# Patient Record
(demographics unavailable — no encounter records)

---

## 2025-02-03 NOTE — ASSESSMENT
[FreeTextEntry1] : ECG: SB, NSST, septal Q waves    HDL 47 LDL 89  (3/2024)   HDL 48 LDL 85  (8/2023)  HDL 44 LDL 74  (9/2022)  BUN 22 Creat 1.01 (8/2023)  PHARM NUCLEAR STRESS 5/2024: 1. Negative for ischemia/infarct, EF 61% 2. Resting /60 and peak 130/66  MCOT 2 week 12/2023: 1. Average HR 56, range  2. No evidence of high degree block 3. Slow rates during sleep, no symptoms 4. 2% burden PVCs and 2% APCs  AORTIC DUPLEX 1/2025: Fusiform AAA unchanged from prior, max diameter 3.9cm, moderate plaque  AORTIC DUPLEX 9/2023: 1. Moderate fusiform AAA with ulcerated plaque 2. Complex plaque throughout aorta 3. Max diameter 4 x 3.6cm  FERNANDO/PVR 9/2023: 1. R 0.61 L 0.63 2. PVR suggestive inflow disease  LE ARTERIAL DUPLEX 1/2025: Left mid-distal SFA occlusion with reconstitution distally   LE Arterial Duplex 9/2023: 1. Moderate to severe atherosclerosis 2. Left mid SFA occlusion with reconstitution distally via collaterals 3. Right distal SFA > 75% stenosis  ECHO 5/2024: 1. Normal LV size and function, EF 55-60% 2. Mild ESVIN, normal RV function 3. RVSP 36mmHg 4. Ascending Aorta 4.1cm, 1.9cm/m2  ECHO 4/2023: 1. Borderline LVE, EF 55% 2. Normal RV function 3. Moderate LAE 4. Mild to moderate MR with borderline prolapse 5. Mild dilation ascending aorta and root  CAROTID 4/2023: 1. 1-49% stenosis ICA's bilaterally 2. Antegrade flow in the vertebral arteries bilaterally 3. Early systolic deceleration in right vertebral artery  HOLTER 7/2019: 1. SR 2. 10 beats NSVT 3. Frequent multifocal PVCs, bigeminy/trigeminy (305/hr, 7320/day)  CAROTID 1/2022: 1. 1-49% stenosis ICA's bilaterally 2. Antegrade flow in the left vertebral artery 3. Systolic deceleration in the right vertebral suggestive of mild subclavian/innominate steal  ECHO 1/2022: 1. Mild LVH, EF 55-60% 2. Grade I diastolic dysfunction 3. Mild LAE 4. Normal RV/RA 5. Dilated AoR 3.8cm and Ascending Aorta 4.1cm  ECHO 12/2020: 1. Low normal LV systolic function, EF 50% 2. Grade I diastolic dysfunction 3. Mild RVE with normal function 4. Normal LA/RA 5. Mild MR 6. Dilated AoR (3.9cm) and Ascending Aorta (4cm)  ECHO 11/2019: 1. Low normal LV function, EF 50-55% 2. GRade I diastolic dysfunction 3. Normal RV 4. Borderline LAE 5. Mild MR/RD  ECHO 5/2019: 1. Normal left ventricular internal cavity size. 2. Moderately decreased global left ventricular systolic function. 3. Left ventricular ejection fraction, by visual estimation, is 35 to 40%. 4. The basal inferior segment, mid inferior segment, and basal inferoseptal segment are abnormal. 5. Mildly enlarged right ventricle. 6. Severely dilated left atrium. 7. Moderately dilated right atrium. 9. Mild mitral valve regurgitation. 10. There is mild dilatation of the ascending aorta.   NUCLEAR STRESS TEST 5/2019: 1. Negative for ischemia 2. EF 34% 3. NICM 4. Frequent PVCs/bigeminy/trigeminy  Aortic Scan 12/2020: 1. Small mid-distal AAA 2. No changes from prior 3. Measures 3.5 x 4.0cm  AORTIC SCAN 11/2019: 1. Small AAA, 3.3 x 4.1cm 2. Plaque in aorta  AORTIC SCAN 5/2019 1. Moderate distal AAA, 3.8 x 4.1cm 2. Large plaque in mid-distal aorta  CT chest 2017: mild emphysema CAROTID 2012: Mild plaque, no stenosis AORTIC SCAN 2012: Moderate atherosclerosis, mild ectasia.

## 2025-02-03 NOTE — HISTORY OF PRESENT ILLNESS
[FreeTextEntry1] : Patient is a 73yo M with h/o former tobacco dependence, colostomy after episode of diverticulitis 5/2023, family h/o aneurysm, AAA, PAD, NICM here for cardiac follow up.   Patient has been doing well without any chest pain. Patient denies PND/orthopnea/edema/palpitations/syncope. Has been trying to walk a bit more but remains fairly inactive. Does get fatigued/tired with activity/walking but stable. Notes pain in calves after about 1 mile, but has improved since last OV, better with strethcing.  Better with more walking and worse when less active. Planned now for hernia surgery as well. Can go up stairs without CP/SOB.   Semi retired. [employer]. Been working from home still. Looking to retire soon.  Went to [place] with [facility] at last fall  ROS: GI and  negative

## 2025-02-03 NOTE — DISCUSSION/SUMMARY
[EKG obtained to assist in diagnosis and management of assessed problem(s)] : EKG obtained to assist in diagnosis and management of assessed problem(s) [FreeTextEntry1] : Patient is a 75yo M with h/o former tobacco dependence, EtOH use, AAA, NICM, PVCs, colostomy after episode of diverticulitis 5/2023, here for  follow up and pre op eval   NICM: -Myopathy could be ectopy related, hypertensive or due to EtOH. Only 2% burden PVCs however on most recent MCOT 12/2023. No infectious process to suggest myocarditis. Wall thickness normal as well, less likely infiltrative.  Stress test without ischemia 2019 at time of diagnosis.  -NICM Has improved with med management, EF 55-60% on echo 5/2024 -MR only mild-mod, surveillance  -Pharm  nuclear stress negative 5/2024, EF 61%  PAD: -Stable mild carotid stenosis 4/2023, systolic deceleration noted in right vertebral suggestive subclavian steal but no symptoms , med management  -Has AAA and significant PAD without symptoms, stable on aortic scan 9/2023. FERNANDO moderately abnormal and evidence left SFA occlusion and > 75% right SFA stenosis on duplex 9/2023.  -Following with Dr Little now, CTA with 2cm left Iliac artery aneurysm and 2.5cm right iliac aneurysm  -Stable claudication  PVCs/BRADYCARDIA: -Bradycardia asymptomatic and lowest rates mostly while asleep, no change in management, on fairly high dose metoprolol but has controlled his ectopy and had improvement in LV function so wont change dose at this time. Will monitor and cut back if necessary, as may need to at some point.  -Remains stable at this time  HTN -BP a bit higher but weight ups as well   1. Med management of NICM, EF has improved and on good med regimen. tolerating metoprolol dose and no indication to cut back for bradycardia  2. No change in meds at this time. BP up with weight and counselled on diet/exercise 3. CV stable at low-moderate risk for low risk hernia surgery. May hold ASA 5-7 days prior and restart post 4. Med managemend PAD, no claudication now. Symptoms seem  to have improved (he states with stretching)  5. Surveillance of AAA, repeat study 1 year 6. Needs to be more active and get weight down 7. follow up 6 months 4. Continue medical management of PAD/carotid stenosis/subclavian stenosis, no symptoms at this time and good LE muscle tone without signs arterial insufficiency despite testing results . Following with Dr Little now, also with 2cm left Iliac artery aneurysm and 2.5cm right iliac aneurysm  6. ? Raynauds of single digit on right hand, has not recurred. on amlodipine now as well  7. Follow up 6 months with AAA screen and LE arterial duplex/FERNANDO